# Patient Record
Sex: MALE | Race: BLACK OR AFRICAN AMERICAN | Employment: UNEMPLOYED | URBAN - METROPOLITAN AREA
[De-identification: names, ages, dates, MRNs, and addresses within clinical notes are randomized per-mention and may not be internally consistent; named-entity substitution may affect disease eponyms.]

---

## 2018-06-12 ENCOUNTER — OFFICE VISIT (OUTPATIENT)
Dept: SURGERY | Age: 43
End: 2018-06-12

## 2018-06-12 VITALS
WEIGHT: 234 LBS | HEART RATE: 92 BPM | SYSTOLIC BLOOD PRESSURE: 132 MMHG | OXYGEN SATURATION: 98 % | RESPIRATION RATE: 16 BRPM | HEIGHT: 66 IN | DIASTOLIC BLOOD PRESSURE: 80 MMHG | BODY MASS INDEX: 37.61 KG/M2 | TEMPERATURE: 98.3 F

## 2018-06-12 DIAGNOSIS — L91.0 KELOID: Primary | ICD-10-CM

## 2018-06-12 PROBLEM — E66.01 SEVERE OBESITY (BMI 35.0-39.9): Status: ACTIVE | Noted: 2018-06-12

## 2018-06-12 NOTE — PROGRESS NOTES
HISTORY OF PRESENT ILLNESS  Grant Clark is a 38 y.o. male who presents for evaluation of a keloid on the posterior aspect of his scalp. HPI Comments: Mr. Davia Felty tells me that he has had a keloid on the posterior aspect of his scalp for some time now. The keloid has become somewhat larger and more bothersome to him. No associated drainage or bleeding. He has otherwise been in his usual state of health. Past Medical History:  6/12/2018: Keloid    History reviewed. No pertinent surgical history. Review of patient's family history indicates:    Cancer                         Mother                    Social History: Employment - [employer]. Tobacco - Denies. EtOH - Denies. Review of systems negative except as noted. Review of Systems   Musculoskeletal: Positive for back pain. Discomfort at site of keloid. Physical Exam   Constitutional: He appears well-developed and well-nourished. No distress. HENT:   Head: Normocephalic and atraumatic. Eyes: No scleral icterus. Neck: Neck supple. Cardiovascular: Normal rate. Pulmonary/Chest: Effort normal and breath sounds normal.   Abdominal: Soft. He exhibits no distension. There is no tenderness. Musculoskeletal: Normal range of motion. Lymphadenopathy:     He has no cervical adenopathy. Neurological: He is alert. Skin:        Approx. 3cm x 2cm keloid. No ulceration or bleeding. Vitals reviewed. ASSESSMENT and PLAN  In view of the findings on H and P, Mr. Davia Felty should benefit from excision of the keloid and application of acellular xenograft. Discussed procedure with him including risks of bleeding, infection, keloid recurrence. Also discussed role of acellular xenograft. He understands and wishes to proceed. I have tentatively scheduled Mr. Davia Felty for surgery on June 22, 2018 at [address] [place] [address] and will see him back in the office postoperatively.  He is agreeable to this plan of action and is most certainly free to contact the office should any questions or concerns arise.

## 2018-06-12 NOTE — MR AVS SNAPSHOT
[address] [facility] [address] [address] 
[phone] Patient: Melody Kidd MRN: [id number] [facility]:[birth year] Visit Information Date & Time Provider Department Dept. Phone Encounter #  
 6/12/2018  2:20 PM MD Tayo Angeles [phone] [id number] Upcoming Health Maintenance Date Due DTaP/Tdap/Td series (1 - Tdap) 9/29/1994 Influenza Age 5 to Adult 8/1/2018 Allergies as of 6/12/2018  Review Complete On: 6/12/2018 By: Bryanna Hayes LPN No Known Allergies Current Immunizations  Never Reviewed No immunizations on file. Not reviewed this visit Vitals BP Pulse Temp Resp Height(growth percentile) Weight(growth percentile) 132/80 (BP 1 Location: Right arm, BP Patient Position: Sitting) 92 98.3 °F (36.8 °C) (Oral) 16 5' 6\" (1.676 m) 234 lb (106.1 kg) SpO2 BMI Smoking Status 98% 37.77 kg/m2 Never Smoker BMI and BSA Data Body Mass Index Body Surface Area  
 37.77 kg/m 2 2.22 m 2 Your Updated Medication List  
  
Notice  As of 6/12/2018  2:47 PM  
 You have not been prescribed any medications. Introducing [facility] & HEALTH SERVICES! Dear Erwin Capone: Thank you for requesting a Smalldeals account. Our records indicate that you already have an active Smalldeals account. You can access your account anytime at [email or web address]. [email or web address] Did you know that you can access your hospital and ER discharge instructions at any time in Smalldeals? You can also review all of your test results from your hospital stay or ER visit. Additional Information If you have questions, please visit the Frequently Asked Questions section of the Smalldeals website at [email or web address] Remember, Smalldeals is NOT to be used for urgent needs. For medical emergencies, dial 911. Now available from your iPhone and Android! Please provide this summary of care documentation to your next provider. Your primary care clinician is listed as Phys Other. If you have any questions after today's visit, please call [phone].

## 2018-06-12 NOTE — PROGRESS NOTES
1. Have you been to the ER, urgent care clinic since your last visit? Hospitalized since your last visit? No    2. Have you seen or consulted any other health care providers outside of the [address] [place] [address] since your last visit? Include any pap smears or colon screening.  No

## 2018-06-15 RX ORDER — BUPIVACAINE HYDROCHLORIDE 2.5 MG/ML
30 INJECTION, SOLUTION EPIDURAL; INFILTRATION; INTRACAUDAL ONCE
Status: CANCELLED | OUTPATIENT
Start: 2018-06-15 | End: 2018-06-15

## 2018-07-23 ENCOUNTER — OFFICE VISIT (OUTPATIENT)
Dept: SURGERY | Age: 43
End: 2018-07-23

## 2018-07-23 VITALS
WEIGHT: 234 LBS | SYSTOLIC BLOOD PRESSURE: 120 MMHG | OXYGEN SATURATION: 98 % | BODY MASS INDEX: 37.61 KG/M2 | HEART RATE: 84 BPM | DIASTOLIC BLOOD PRESSURE: 82 MMHG | RESPIRATION RATE: 16 BRPM | TEMPERATURE: 98.1 F | HEIGHT: 66 IN

## 2018-07-23 DIAGNOSIS — L91.0 KELOID: Primary | ICD-10-CM

## 2018-07-23 NOTE — PROGRESS NOTES
1. Have you been to the ER, urgent care clinic since your last visit? Hospitalized since your last visit? No    2. Have you seen or consulted any other health care providers outside of the [address] [place] [address] since your last visit? Include any pap smears or colon screening.  No

## 2018-07-23 NOTE — PROGRESS NOTES
HISTORY OF PRESENT ILLNESS  Jeison Jorge is a 38 y.o. male who returns for repeat evaluation of a keloid on his posterior scalp. HPI Comments: Mr. Margarita Alfaro was initially seen on 6/12/2018 for evaluation of a keloid on the posterior aspect of his scalp. Doing fairly well since then. He tells me that the keloid is becoming larger and more bothersome to him. He has also noted bleeding from the keloid. He has otherwise been in his usual state of health. Past Medical History:  6/12/2018: Keloid    History reviewed. No pertinent surgical history. Review of patient's family history indicates:    Cancer                         Mother                    Social History    Marital status:              Spouse name:                       Years of education:                 Number of children:               Social History Main Topics    Smoking status: Never Smoker                                                                Smokeless status: Never Used                        Alcohol use: No              Drug use: No              Sexual activity: Yes               Partners with: Female    Review of systems negative except as noted. Keloid   The history is provided by the patient. Review of Systems   Musculoskeletal:        Pain at site of keloid. Bleeding from keloid. Physical Exam   Constitutional: He appears well-developed and well-nourished. No distress. HENT:   Head: Normocephalic and atraumatic. Eyes: No scleral icterus. Neck: Neck supple. Cardiovascular: Normal rate and regular rhythm. Pulmonary/Chest: Effort normal and breath sounds normal.   Abdominal: Soft. He exhibits no distension. There is no tenderness. Musculoskeletal: Normal range of motion. Lymphadenopathy:     He has no cervical adenopathy. Neurological: He is alert. Skin:        Approx. 3cm x 2.5cm keloid. No active infection. No bleeding. Vitals reviewed.       ASSESSMENT and PLAN  In view of the findings on H and P, Mr. Jojo Hall should benefit from excision of the keloid as it is becoming larger and is now bleeding. Discussed excision of the keloid with him including risks of bleeding, infection, recurrence. He understands and wishes to proceed. I have tentatively scheduled Mr. Jojo Hall for surgery on August 3, 2018 at [facility] and will see him back in the office postoperatively. He is agreeable to this plan of action and is most certainly free to contact the office should any questions or concerns arise.

## 2018-07-23 NOTE — MR AVS SNAPSHOT
[address] [facility] [address] [address] 
[phone] Patient: Jaz Vu MRN: [id number] RUK:2[birth year] Visit Information Date & Time Provider Department Dept. Phone Encounter #  
 7/23/2018  9:20 AM MD Prateek [address] 137 [phone] [id number] Upcoming Health Maintenance Date Due DTaP/Tdap/Td series (1 - Tdap) 9/29/1994 Influenza Age 5 to Adult 8/1/2018 Allergies as of 7/23/2018  Review Complete On: 7/23/2018 By: Chelle Valencia LPN No Known Allergies Current Immunizations  Never Reviewed No immunizations on file. Not reviewed this visit Vitals BP Pulse Temp Resp Height(growth percentile) Weight(growth percentile) 120/82 (BP 1 Location: Right arm, BP Patient Position: Sitting) 84 98.1 °F (36.7 °C) (Oral) 16 5' 6\" (1.676 m) 234 lb (106.1 kg) SpO2 BMI Smoking Status 98% 37.77 kg/m2 Never Smoker BMI and BSA Data Body Mass Index Body Surface Area  
 37.77 kg/m 2 2.22 m 2 Your Updated Medication List  
  
Notice  As of 7/23/2018  9:39 AM  
 You have not been prescribed any medications. Introducing [facility] & HEALTH SERVICES! Dear Karo Officer: Thank you for requesting a Fi.tt account. Our records indicate that you already have an active Fi.tt account. You can access your account anytime at [email or web address]. [email or web address] Did you know that you can access your hospital and ER discharge instructions at any time in Fi.tt? You can also review all of your test results from your hospital stay or ER visit. Additional Information If you have questions, please visit the Frequently Asked Questions section of the Fi.tt website at [email or web address]. [email or web address]/. Remember, Fi.tt is NOT to be used for urgent needs. For medical emergencies, dial 911. Now available from your iPhone and Android! Please provide this summary of care documentation to your next provider. Your primary care clinician is listed as Phys Other. If you have any questions after today's visit, please call [phone].

## 2018-08-20 ENCOUNTER — OFFICE VISIT (OUTPATIENT)
Dept: SURGERY | Age: 43
End: 2018-08-20

## 2018-08-20 VITALS — DIASTOLIC BLOOD PRESSURE: 86 MMHG | HEART RATE: 84 BPM | TEMPERATURE: 98.1 F | SYSTOLIC BLOOD PRESSURE: 132 MMHG

## 2018-08-20 DIAGNOSIS — L91.0 KELOID: Primary | ICD-10-CM

## 2018-08-20 NOTE — MR AVS SNAPSHOT
[address] [facility] [address] [address] 
[phone] Patient: Shayla Layne MRN: [id number] CSS:8/18/6350 Visit Information Date & Time Provider Department Dept. Phone Encounter #  
 8/20/2018  2:20 PM MD Ezequiel L[address] 137 [phone] [id number] Upcoming Health Maintenance Date Due DTaP/Tdap/Td series (1 - Tdap) 9/29/1994 Influenza Age 5 to Adult 8/1/2018 Allergies as of 8/20/2018  Review Complete On: 8/20/2018 By: Brenda Borrego MD  
 No Known Allergies Current Immunizations  Never Reviewed No immunizations on file. Not reviewed this visit You Were Diagnosed With   
  
 Codes Comments Keloid    -  Primary ICD-10-CM: L91.0 ICD-9-CM: 701.4 Vitals BP Pulse Temp Smoking Status 132/86 (BP 1 Location: Left arm, BP Patient Position: Sitting) 84 98.1 °F (36.7 °C) (Oral) Never Smoker Your Updated Medication List  
  
Notice  As of 8/20/2018  4:01 PM  
 You have not been prescribed any medications. Introducing [facility] & HEALTH SERVICES! Dear Mary Carmen Calderón: Thank you for requesting a FleetMatics account. Our records indicate that you already have an active FleetMatics account. You can access your account anytime at [email or web address]. [email or web address] Did you know that you can access your hospital and ER discharge instructions at any time in FleetMatics? You can also review all of your test results from your hospital stay or ER visit. Additional Information If you have questions, please visit the Frequently Asked Questions section of the [employer] website at [email or web address]/. Remember, FleetMatics is NOT to be used for urgent needs. For medical emergencies, dial 911. Now available from your iPhone and Android! Please provide this summary of care documentation to your next provider. Your primary care clinician is listed as Phys Other. If you have any questions after today's visit, please call [phone].

## 2018-08-20 NOTE — PROGRESS NOTES
HISTORY OF PRESENT ILLNESS  Nito Carvajal is a 38 y.o. male who returns for evaluation of a keloid on the posterior scalp. HPI Comments: Mr. Nancy Bentley has been doing fairly well since his last visit. He reports no significant change in the size of the keloid. However, the keloid has recently started to bleed. Insurance has declined coverage for keloid excision. He has otherwise been in his usual state of health. Past Medical History:  6/12/2018: Keloid    History reviewed. No pertinent surgical history. Review of patient's family history indicates:    Cancer                         Mother                    Social History    Marital status:              Spouse name:                       Years of education:                 Number of children:               Social History Main Topics    Smoking status: Never Smoker                                                                Smokeless status: Never Used                        Alcohol use: No              Drug use: No              Sexual activity: Yes               Partners with: Female    Review of systems negative except as noted. Review of Systems   Musculoskeletal:        Discomfort at site of keloid. Physical Exam   Constitutional: He appears well-developed and well-nourished. No distress. HENT:   Head: Normocephalic and atraumatic. Eyes: No scleral icterus. Neck: Neck supple. Cardiovascular: Normal rate and regular rhythm. Pulmonary/Chest: Effort normal and breath sounds normal.   Abdominal: He exhibits no distension. There is no tenderness. Musculoskeletal: Normal range of motion. Lymphadenopathy:     He has no cervical adenopathy. Neurological: He is alert. Skin:        Approx. 3cm x 2.5cm keloid. There is evidence of recent bleeding. Vitals reviewed. ASSESSMENT and PLAN  In view of the findings on H and P, Mr. Nancy Bentley should benefit from excision of the keloid as it is bleeding intermittently.  Discussed procedure with him including risks of bleeding, infection, recurrence. He understands and wishes to proceed. Will schedule surgery if approved. In the interim, will try Kenalog injection. Discussed procedure with him including risks of bleeding, infection, possible need for further injections. He understands and wishes to proceed. Consent on chart. [facility] GENERAL SURGERY [address]  OFFICE PROCEDURE PROGRESS NOTE        Chart reviewed for the following:   Rodrigo Sullivan MD, have reviewed the History, Physical and updated the Allergic reactions for [address] performed immediately prior to start of procedure:   Rodrigo Sullivan MD, have performed the following reviews on Lear Carvajal prior to the start of the procedure:            * Patient was identified by name and date of birth   * Agreement on procedure being performed was verified  * Risks and Benefits explained to the patient  * Procedure site verified and marked as necessary  * Patient was positioned for comfort  * Consent was signed and verified     Time: 3:35 PM      Date of procedure: 8/20/2018    Procedure performed by:  Deena Self MD    Provider assisted by: Hernando Thomas LPN    How tolerated by patient: tolerated the procedure well with no complications    Post Procedural Pain Scale: 0 - No Hurt    Comments: None. Procedure:    Keloid prepped with betadine. The keloid was injected with 10cc of a 1:1 mixture of Kenalog 40 and 2% Lidocaine with epinephrine. Hemostasis with pressure. At this point in time, will see Mr. Nancy Bentley in another 4 weeks or earlier if need be. He may benefit from repeat Kenalog injection at some point. He is agreeable to this plan and is most certainly free to contact the office should any questions or concerns arise.

## 2018-09-17 ENCOUNTER — OFFICE VISIT (OUTPATIENT)
Dept: SURGERY | Age: 43
End: 2018-09-17

## 2018-09-17 VITALS
OXYGEN SATURATION: 96 % | RESPIRATION RATE: 16 BRPM | HEIGHT: 66 IN | TEMPERATURE: 98.2 F | DIASTOLIC BLOOD PRESSURE: 88 MMHG | SYSTOLIC BLOOD PRESSURE: 133 MMHG | BODY MASS INDEX: 44.11 KG/M2 | WEIGHT: 274.5 LBS | HEART RATE: 76 BPM

## 2018-09-17 DIAGNOSIS — L91.0 KELOID: Primary | ICD-10-CM

## 2018-09-17 NOTE — PROGRESS NOTES
HISTORY OF PRESENT ILLNESS  Elly Romero is a 38 y.o. male who returns for follow up of a keloid on the posterior aspect of his scalp. HPI Comments: Mr. Nandini Cochran was last seen on 8/20/2018 at which time he underwent Kenalog injection of the keloid on the posterior aspect of his scalp. Doing well since then. He reports no significant change in the size of the keloid. He would like to try another Kenalog injection. He has otherwise been in his usual state of health. Past Medical History:  6/12/2018: Keloid    History reviewed. No pertinent surgical history. Review of patient's family history indicates:    Cancer                         Mother                    Social History    Marital status:              Spouse name:                       Years of education:                 Number of children:               Social History Main Topics    Smoking status: Never Smoker                                                                Smokeless status: Never Used                        Alcohol use: No              Drug use: No              Sexual activity: Yes               Partners with: Female    Review of systems negative except as noted. Review of Systems   Musculoskeletal:        Discomfort at site of keloid. Physical Exam   Constitutional: He appears well-developed and well-nourished. No distress. HENT:   Head: Normocephalic and atraumatic. Eyes: No scleral icterus. Cardiovascular: Normal rate and regular rhythm. Pulmonary/Chest: Effort normal and breath sounds normal.   Abdominal: Soft. He exhibits no distension. There is no tenderness. Musculoskeletal: Normal range of motion. Neurological: He is alert. Skin:        No significant change in size of the 3cm x 2cm keloid. No associated ulceration or bleeding. Vitals reviewed. ASSESSMENT and PLAN  In view of the findings on H and P, Mr. Nandini Cochran may benefit from repeat Kenalog injection of the keloid.  Discussed procedure with him including risks of bleeding, infection, possibility that there will be no improvement in the keloid. He understands and wishes to proceed. Consent on chart. Mr. Linda Buckner had to leave before the procedure could be performed. He will return on 9/18/2018 for Kenalog injection.

## 2018-09-17 NOTE — MR AVS SNAPSHOT
[address] [facility] [address] [facility] [address] [address] 
[phone] Patient: Vincent Summers MRN: [id number] BSB:[birth year] Visit Information Date & Time Provider Department Dept. Phone Encounter #  
 9/17/2018  2:00 PM Stormy Pimentel MD [address] [phone] [id number] Your Appointments 9/18/2018  4:00 PM  
ESTABLISHED PATIENT with Stormy Pimentel MD  
[address] ([address]) Appt Note: EP/ Patient is coming in for 2nd steroid injections for scalp keloid [address] [facility] [address] [facility] [address]  
[address] [place] [address] Upcoming Health Maintenance Date Due DTaP/Tdap/Td series (1 - Tdap) 9/29/1994 Influenza Age 5 to Adult 8/1/2018 Allergies as of 9/17/2018  Review Complete On: 9/17/2018 By: Stormy Pimentel MD  
 No Known Allergies Current Immunizations  Never Reviewed No immunizations on file. Not reviewed this visit You Were Diagnosed With   
  
 Codes Comments Keloid    -  Primary ICD-10-CM: L91.0 ICD-9-CM: 701.4 Vitals BP Pulse Temp Resp Height(growth percentile) Weight(growth percentile) 133/88 (BP 1 Location: Left arm, BP Patient Position: Sitting) 76 98.2 °F (36.8 °C) (Oral) 16 5' 6\" (1.676 m) 274 lb 8 oz (124.5 kg) SpO2 BMI Smoking Status 96% 44.31 kg/m2 Never Smoker BMI and BSA Data Body Mass Index Body Surface Area 44.31 kg/m 2 2.41 m 2 Your Updated Medication List  
  
Notice  As of 9/17/2018  4:49 PM  
 You have not been prescribed any medications. Introducing [facility] & HEALTH SERVICES! Dear Miriam Han: Thank you for requesting a GamePix account. Our records indicate that you already have an active GamePix account. You can access your account anytime at [email or web address]. [email or web address] Media Did you know that you can access your hospital and ER discharge instructions at any time in [employer]? You can also review all of your test results from your hospital stay or ER visit. Additional Information If you have questions, please visit the Frequently Asked Questions section of the [employer] website at [email or web address]. [email or web address]/Peku Publicationst/. Remember, [employer] is NOT to be used for urgent needs. For medical emergencies, dial 911. Now available from your iPhone and Android! Please provide this summary of care documentation to your next provider. Your primary care clinician is listed as Phys Other. If you have any questions after today's visit, please call [phone].

## 2018-09-17 NOTE — PROGRESS NOTES
1. Have you been to the ER, urgent care clinic since your last visit? Hospitalized since your last visit? No    2. Have you seen or consulted any other health care providers outside of the [address] [place] [address] since your last visit? Include any pap smears or colon screening.  No

## 2020-06-22 ENCOUNTER — HOSPITAL ENCOUNTER (OUTPATIENT)
Dept: LAB | Age: 45
Discharge: HOME | End: 2020-06-22

## 2020-06-22 ENCOUNTER — OFFICE VISIT (OUTPATIENT)
Dept: FAMILY MEDICINE CLINIC | Age: 45
End: 2020-06-22

## 2020-06-22 VITALS
SYSTOLIC BLOOD PRESSURE: 121 MMHG | HEIGHT: 66 IN | OXYGEN SATURATION: 96 % | HEART RATE: 88 BPM | WEIGHT: 286 LBS | TEMPERATURE: 98.3 F | RESPIRATION RATE: 16 BRPM | BODY MASS INDEX: 45.96 KG/M2 | DIASTOLIC BLOOD PRESSURE: 87 MMHG

## 2020-06-22 DIAGNOSIS — R51.9 HEADACHE ABOVE THE EYE REGION: ICD-10-CM

## 2020-06-22 DIAGNOSIS — Z76.89 ENCOUNTER TO ESTABLISH CARE: ICD-10-CM

## 2020-06-22 DIAGNOSIS — R20.0 NUMBNESS AND TINGLING IN BOTH HANDS: Primary | ICD-10-CM

## 2020-06-22 DIAGNOSIS — R20.2 NUMBNESS AND TINGLING IN BOTH HANDS: Primary | ICD-10-CM

## 2020-06-22 LAB
ALBUMIN SERPL-MCNC: 3.9 G/DL (ref 3.5–5)
ALBUMIN/GLOB SERPL: 1 {RATIO} (ref 1.1–2.2)
ALP SERPL-CCNC: 66 U/L (ref 45–117)
ALT SERPL-CCNC: 79 U/L (ref 12–78)
ANION GAP SERPL CALC-SCNC: 9 MMOL/L (ref 5–15)
AST SERPL-CCNC: 33 U/L (ref 15–37)
BASOPHILS # BLD: 0 K/UL (ref 0–0.1)
BASOPHILS NFR BLD: 0 % (ref 0–1)
BILIRUB SERPL-MCNC: 0.4 MG/DL (ref 0.2–1)
BUN SERPL-MCNC: 21 MG/DL (ref 6–20)
BUN/CREAT SERPL: 22 (ref 12–20)
CALCIUM SERPL-MCNC: 9.2 MG/DL (ref 8.5–10.1)
CHLORIDE SERPL-SCNC: 105 MMOL/L (ref 97–108)
CHOLEST SERPL-MCNC: 201 MG/DL
CO2 SERPL-SCNC: 23 MMOL/L (ref 21–32)
CREAT SERPL-MCNC: 0.95 MG/DL (ref 0.7–1.3)
DIFFERENTIAL METHOD BLD: NORMAL
EOSINOPHIL # BLD: 0.1 K/UL (ref 0–0.4)
EOSINOPHIL NFR BLD: 1 % (ref 0–7)
ERYTHROCYTE [DISTWIDTH] IN BLOOD BY AUTOMATED COUNT: 14.5 % (ref 11.5–14.5)
EST. AVERAGE GLUCOSE BLD GHB EST-MCNC: 120 MG/DL
GLOBULIN SER CALC-MCNC: 3.8 G/DL (ref 2–4)
GLUCOSE SERPL-MCNC: 101 MG/DL (ref 65–100)
HBA1C MFR BLD: 5.8 % (ref 4–5.6)
HCT VFR BLD AUTO: 48.6 % (ref 36.6–50.3)
HDLC SERPL-MCNC: 38 MG/DL
HDLC SERPL: 5.3 {RATIO} (ref 0–5)
HGB BLD-MCNC: 15.8 G/DL (ref 12.1–17)
IMM GRANULOCYTES # BLD AUTO: 0 K/UL (ref 0–0.04)
IMM GRANULOCYTES NFR BLD AUTO: 0 % (ref 0–0.5)
LDLC SERPL CALC-MCNC: 121.2 MG/DL (ref 0–100)
LIPID PROFILE,FLP: ABNORMAL
LYMPHOCYTES # BLD: 2.1 K/UL (ref 0.8–3.5)
LYMPHOCYTES NFR BLD: 29 % (ref 12–49)
MCH RBC QN AUTO: 29 PG (ref 26–34)
MCHC RBC AUTO-ENTMCNC: 32.5 G/DL (ref 30–36.5)
MCV RBC AUTO: 89.3 FL (ref 80–99)
MONOCYTES # BLD: 0.6 K/UL (ref 0–1)
MONOCYTES NFR BLD: 8 % (ref 5–13)
NEUTS SEG # BLD: 4.4 K/UL (ref 1.8–8)
NEUTS SEG NFR BLD: 62 % (ref 32–75)
NRBC # BLD: 0 K/UL (ref 0–0.01)
NRBC BLD-RTO: 0 PER 100 WBC
PLATELET # BLD AUTO: 221 K/UL (ref 150–400)
PMV BLD AUTO: 12.1 FL (ref 8.9–12.9)
POTASSIUM SERPL-SCNC: 4.5 MMOL/L (ref 3.5–5.1)
PROT SERPL-MCNC: 7.7 G/DL (ref 6.4–8.2)
RBC # BLD AUTO: 5.44 M/UL (ref 4.1–5.7)
SODIUM SERPL-SCNC: 137 MMOL/L (ref 136–145)
TRIGL SERPL-MCNC: 209 MG/DL (ref ?–150)
TSH SERPL DL<=0.05 MIU/L-ACNC: 1.84 UIU/ML (ref 0.36–3.74)
VLDLC SERPL CALC-MCNC: 41.8 MG/DL
WBC # BLD AUTO: 7.2 K/UL (ref 4.1–11.1)

## 2020-06-22 RX ORDER — NAPROXEN SODIUM 220 MG
220 TABLET ORAL 2 TIMES DAILY WITH MEALS
COMMUNITY

## 2020-06-22 RX ORDER — ACETAMINOPHEN 500 MG
TABLET ORAL
COMMUNITY

## 2020-06-22 NOTE — PROGRESS NOTES
Leola Marin is a 53 y.o. male who presents to clinic today for the following:    Chief Complaint   Patient presents with    Numbness     Patient reports numbness and tingling bilateral hands past few year. Patient reports starting more frequent.  Headache    Referral / Consult     Back pain. Patient denies any injury to back. Vitals:    06/22/20 1127   BP: 121/87   Pulse: 88   Resp: 16   Temp: 98.3 °F (36.8 °C)   TempSrc: Oral   SpO2: 96%   Weight: 286 lb (129.7 kg)   Height: 5' 6\" (1.676 m)       Body mass index is 46.16 kg/m². Patients past medical, surgical and family histories were reviewed. Allergies and Medications reviewed and updated. Current Outpatient Medications:     naproxen sodium (Aleve) 220 mg tablet, Take 220 mg by mouth two (2) times daily (with meals). , Disp: , Rfl:     acetaminophen (Tylenol Extra Strength) 500 mg tablet, Take  by mouth every six (6) hours as needed for Pain., Disp: , Rfl:     No Known Allergies    Past Medical History:   Diagnosis Date    Keloid 6/12/2018       Past Surgical History:   Procedure Laterality Date    HX SKIN BIOPSY         Family History   Problem Relation Age of Onset    Cancer Mother        Social History     Socioeconomic History    Marital status:      Spouse name: Not on file    Number of children: Not on file    Years of education: Not on file    Highest education level: Not on file   Occupational History    Not on file   Social Needs    Financial resource strain: Not on file    Food insecurity     Worry: Not on file     Inability: Not on file   [employer] needs     Medical: Not on file     Non-medical: Not on file   Tobacco Use    Smoking status: Never Smoker    Smokeless tobacco: Never Used   Substance and Sexual Activity    Alcohol use: No    Drug use: No    Sexual activity: Yes     Partners: Female   Lifestyle    Physical activity     Days per week: Not on file     Minutes per session: Not on file    Stress: Not on file   Relationships    Social connections     Talks on phone: Not on file     Gets together: Not on file     Attends Yazidi service: Not on file     Active member of club or organization: Not on file     Attends meetings of clubs or organizations: Not on file     Relationship status: Not on file    Intimate partner violence     Fear of current or ex partner: Not on file     Emotionally abused: Not on file     Physically abused: Not on file     Forced sexual activity: Not on file   Other Topics Concern    Not on file   Social History Narrative    Not on file           Physical Exam  Vitals signs reviewed. Constitutional:       Appearance: He is obese. HENT:      Head: Normocephalic and atraumatic. Nose: Nose normal.      Mouth/Throat:      Mouth: Mucous membranes are moist.   Eyes:      Pupils: Pupils are equal, round, and reactive to light. Neck:      Musculoskeletal: Normal range of motion. Cardiovascular:      Rate and Rhythm: Normal rate and regular rhythm. Pulses: Normal pulses. Heart sounds: Normal heart sounds. Pulmonary:      Effort: Pulmonary effort is normal.      Breath sounds: Normal breath sounds. Abdominal:      Palpations: Abdomen is soft. Musculoskeletal:      Comments: Low back pain, bilateral arm numbness at times   Skin:     General: Skin is warm and dry. Capillary Refill: Capillary refill takes less than 2 seconds. Neurological:      General: No focal deficit present. Mental Status: He is alert and oriented to person, place, and time. Cranial Nerves: Cranial nerves are intact. Sensory: No sensory deficit. Coordination: Coordination is intact. Gait: Gait is intact. Comments: Bilateral arm numbness times          Pt is new today, establish care. PT has occasion HA above eys at times, none current, occasional bilateral arm numbness and tingling when hold phone and low back pain.   Pt today has a simone Neuro exam, clear BBS, no cp. PT has not had labs done, ordered today, refer to Neuro for nerve pain and numbess as well as HA, PT was wanted for low back pain. Will alisha sal with pt when labs return for further POC. Review of Systems   Constitutional: Negative. HENT: Negative. Eyes: Negative. Respiratory: Negative. Cardiovascular: Negative. Gastrointestinal: Negative. Musculoskeletal: Positive for back pain. Skin: Negative. Neurological: Positive for tingling. Psychiatric/Behavioral: Negative. No results found. No results found for this or any previous visit (from the past 24 hour(s)). Assessment and Plan:    Encounter Diagnoses   Name Primary?  Numbness and tingling in both hands Yes    Headache above the eye region     Encounter to establish care         Pt is new today, establish care. PT has occasion HA above eys at times, none current, occasional bilateral arm numbness and tingling when hold phone and low back pain. Pt today has a simone Neuro exam, clear BBS, no cp. PT has not had labs done, ordered today, refer to Neuro for nerve pain and numbess as well as HA, PT was wanted for low back pain. Will alisha oronap with pt when labs return for further POC. I have discussed the diagnosis with the patient and the intended plan as seen in the above orders. he has expressed understanding. The patient has received an after-visit summary and questions were answered concerning future plans. I have discussed medication side effects and warnings with the patient as well. Follow-up and Dispositions    · Return in about 2 weeks (around 7/6/2020), or if symptoms worsen or fail to improve, for Follow up.          Electronically Signed: Lata Parmar NP

## 2020-06-22 NOTE — PATIENT INSTRUCTIONS
Today you were evalauted for Headache, numbness in your arms and hands and to establish care. We have drawn labs and will follow up with you the results in a couple of days. I have sent a referral to Neurology for your numbness. Please call them by tomorrow if you have not heard from them. I am also sending a refereal for Physical Therapy for your back pain. Again, we will follow up with labs, let us know of any other concerns as the arise.

## 2020-06-22 NOTE — PROGRESS NOTES
1. Have you been to the ER, urgent care clinic since your last visit? Hospitalized since your last visit? Yes When: 6/2020 Where: [facility] Reason for visit: Blood Sugar elevated    2. Have you seen or consulted any other health care providers outside of the [address] [place] [address] since your last visit? Include any pap smears or colon screening. No     Chief Complaint   Patient presents with    Numbness     Patient reports numbness and tingling bilateral hands past few year. Patient reports starting more frequent.     Headache       Visit Vitals  /87 (BP 1 Location: Left arm, BP Patient Position: Sitting)   Pulse 88   Temp 98.3 °F (36.8 °C) (Oral)   Resp 16   Ht 5' 6\" (1.676 m)   Wt 286 lb (129.7 kg)   SpO2 96%   BMI 46.16 kg/m²

## 2020-06-23 ENCOUNTER — OFFICE VISIT (OUTPATIENT)
Dept: NEUROLOGY | Age: 45
End: 2020-06-23

## 2020-06-23 VITALS
DIASTOLIC BLOOD PRESSURE: 80 MMHG | BODY MASS INDEX: 45.96 KG/M2 | HEART RATE: 88 BPM | WEIGHT: 286 LBS | OXYGEN SATURATION: 98 % | SYSTOLIC BLOOD PRESSURE: 130 MMHG | HEIGHT: 66 IN | TEMPERATURE: 98.2 F

## 2020-06-23 DIAGNOSIS — G44.219 EPISODIC TENSION-TYPE HEADACHE, NOT INTRACTABLE: ICD-10-CM

## 2020-06-23 DIAGNOSIS — M54.12 CERVICAL RADICULOPATHY: ICD-10-CM

## 2020-06-23 DIAGNOSIS — R29.898 LEFT ARM WEAKNESS: Primary | ICD-10-CM

## 2020-06-23 NOTE — PATIENT INSTRUCTIONS
Electromyogram (EMG) and Nerve Conduction Studies: About These Tests What are they? An electromyogram (EMG) measures the electrical activity of your muscles when you are not using them (at rest) and when you tighten them (muscle contraction). Nerve conduction studies (NCS) measure how well and how fast the nerves can send electrical signals. EMG and nerve conduction studies are often done together. If they are done together, the nerve conduction studies are done before the EMG. Why are they done? You may need an EMG to find diseases that damage your muscles or nerves or to find why you can't move your muscles (paralysis), why they feel weak, or why they twitch. These problems may include a herniated disc, amyotrophic lateral sclerosis (ALS), or myasthenia gravis (MG). You may need nerve conduction studies to find damage to the nerves that lead from the brain and spinal cord to the rest of the body. (This is called the peripheral nervous system.) These studies are often used to help find nerve disorders, such as carpal tunnel syndrome. How do you prepare for these tests? · Wear loose-fitting clothing. You may be given a hospital gown to wear. · The electrodes for the test are attached to your skin. Your skin needs to be clean and free of sprays, oils, creams, and lotions. · You may be asked to sign a consent form that says you understand the risks of the test and agree to have it done. · Tell your doctor ALL the medicines, vitamins, supplements, and herbal remedies you take. Some may increase the risk of problems during your test. Your doctor will tell you if you should stop taking any of them before the test and how soon to do it. · If you take aspirin or some other blood thinner, ask your doctor if you should stop taking it before your test. Make sure that you understand exactly what your doctor wants you to do. These medicines increase the risk of bleeding. How are the tests done? You lie on a table or bed or sit in a reclining chair so your muscles are relaxed. For an EMG:  
· Your doctor will insert a needle electrode into a muscle. This will record the electrical activity while the muscle is at rest. 
· Your doctor will ask you to tighten the same muscle slowly and steadily while the electrical activity is recorded. · Your doctor may move the electrode to a different area of the muscle or a different muscle. For nerve conduction studies:  
· Your doctor will attach two types of electrodes to your skin. ? One type of electrode is placed over a nerve and will give the nerve an electrical pulse. ? The other type of electrode is placed over the muscle that the nerve controls. It will record how long it takes the muscle to react to the electrical pulse. How does having electromyogram (EMG) and nerve conduction studies feel? During an EMG test, you may feel a quick, sharp pain when the needle electrode is put into a muscle. With nerve conduction studies, you will be able to feel the electrical pulses. The tests make some people anxious. Keep in mind that only a very low-voltage electrical current is used. And each electrical pulse is very quick. It lasts less than a second. How long do they take? · An EMG may take 30 to 60 minutes. · Nerve conduction tests may take from 15 minutes to 1 hour or more. It depends on how many nerves and muscles your doctor tests. What happens after these tests? · After the test, you may be sore and feel a tingling in your muscles. This may last for up to 2 days. · If any of the test areas are sore: 
? Put ice or a cold pack on the area for 10 to 20 minutes at a time. Put a thin cloth between the ice and your skin. ? Take an over-the-counter pain medicine, such as acetaminophen (Tylenol), ibuprofen (Advil, Motrin), or naproxen (Aleve). Be safe with medicines. Read and follow all instructions on the label. · You will probably be able to go home right away. It depends on the reason for the test. 
· You can go back to your usual activities right away. When should you call for help? Watch closely for changes in your health, and be sure to contact your doctor if: · Muscle pain from an EMG test gets worse or you have swelling, tenderness, or pus at any of the needle sites. · You have any problems that you think may be from the test. 
· You have any questions about the test or have not received your results. Follow-up care is a key part of your treatment and safety. Be sure to make and go to all appointments, and call your doctor if you are having problems. It's also a good idea to keep a list of the medicines you take. Ask your doctor when you can expect to have your test results. Where can you learn more? Go to http://bee-tanner.info/ Enter S863 in the search box to learn more about \"Electromyogram (EMG) and Nerve Conduction Studies: About These Tests. \" Current as of: November 20, 2019               Content Version: 12.5 © 2956-3719 [employer]. Care instructions adapted under license by [employer] (which disclaims liability or warranty for this information). If you have questions about a medical condition or this instruction, always ask your healthcare professional. [address] any warranty or liability for your use of this information. MRI of the Cervical Spine: About This Test 
What is it? MRI (magnetic resonance imaging) is a test that uses a magnetic field and pulses of radio wave energy to make pictures of the organs and structures inside the body. An MRI can give your doctor information about the spine in your neck (the cervical spine). This can include the spine, the space around the spinal cord, and vertebrae in your neck. When you have an MRI, you lie on a table and the table moves into the MRI machine. Why is this test done? An MRI of the cervical spine can help find problems such as infection and tumors. It also can help diagnose narrowing of the spinal canal (spinal stenosis) and a herniated disc in the cervical spine. How do you prepare for the test? 
In general, there's nothing you have to do before this test, unless your doctor tells you to. Tell your doctor if you get nervous in tight spaces. You may get a medicine to help you relax. If you think you'll get this medicine, be sure you have someone to take you home. How is the test done? · You may have contrast material (dye) put into your arm through a tube called an IV. · You will lie on a table that's part of the MRI scanner. · The table will slide into the space that contains the magnet. · Inside the scanner, you will hear a fan and feel air moving. You may hear tapping, thumping, or snapping noises. You may be given earplugs or headphones to reduce the noise. · You will be asked to hold still during the scan. You may be asked to hold your breath for short periods. · You may be alone in the scanning room. But a technologist will watch through a window and talk with you during the test. 
How does having an MRI of the spine feel? You won't have pain from the magnetic field or radio waves used for the MRI test. You may be tired or sore from lying in one position for a long time. If a contrast material is used, you may feel some coolness when it is put into your IV. In rare cases, you may feel: · Tingling in the mouth if you have metal dental fillings. · Warmth in the area being checked. This is normal. Tell the technologist if you have nausea, vomiting, a headache, dizziness, pain, burning, or breathing problems. How long does the test take? The test usually takes 30 to 60 minutes but can take as long as 2 hours. What are the risks of an MRI of the spine?  
There are no known harmful effects from the strong magnetic field used for an MRI. But the magnet is very powerful. It may affect any metal implants or other medical devices you have. Risks from contrast material 
Contrast material that contains gadolinium may be used in this test. But for most people, the benefit of its use in this test outweighs the risk. Be sure to tell your doctor if you have kidney problems or are pregnant. There is a slight chance of an allergic reaction if contrast material is used during the test. But most reactions are mild and can be treated using medicine. If you breastfeed and are concerned about whether the contrast material used in this test is safe, talk to your doctor. Most experts believe that very little dye passes into breast milk and even less is passed on to the baby. But if you are concerned, you can stop breastfeeding for up to 24 hours after the test. During this time, you can give your baby breast milk that you stored before the test. Don't use the breast milk you pump in the 24 hours after the test. Throw it out. What happens after the test? 
· You will probably be able to go home right away. It depends on the reason for the test. 
· You can go back to your usual activities right away. Follow-up care is a key part of your treatment and safety. Be sure to make and go to all appointments, and call your doctor if you are having problems. It's also a good idea to keep a list of the medicines you take. Ask your doctor when you can expect to have your test results. Where can you learn more? Go to http://bee-tanner.info/ Enter F223 in the search box to learn more about \"MRI of the Cervical Spine: About This Test.\" Current as of: December 9, 2019               Content Version: 12.5 © 9855-7077 [employer]. Care instructions adapted under license by [employer] (which disclaims liability or warranty for this information).  If you have questions about a medical condition or this instruction, always ask your healthcare professional. [place] [address] any warranty or liability for your use of this information.

## 2020-06-23 NOTE — PROGRESS NOTES
Your Cholesterol and Hemoglobin A1C is slightly elevated. Diet and exercise can help reduce this now. Please try these changes over the next 3 month and return for repeat labs. If they remain elevated, we may need to put you on medication to reduce the risk of stroke, heart attack and diabetes.

## 2020-06-23 NOTE — PROGRESS NOTES
Chief Complaint   Patient presents with    Headache     with numbness and tingling in finger, \"the headaches are not severe but they are progressively getting worse, the numbness in my hands is also getting worse\"       Referred by: Jean LUGO    Mr. Carrie Morton is a 40-year-old gentleman here because of numbness and tingling in both distal hands that have been going on for a few years worse in the past couple months symmetric beginning in the fingers radiating proximally to just below the elbows. No feet symptoms. He can sometimes worsen the paresthesias depending on the motion of his head or neck. Sometimes the symptoms wake him up at night. 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 He does have a history of degenerative disc disease he tells me diagnosed about 10 years ago both in the upper neck and low back. Additionally he is noticing some left supra orbital pain/headache non-debilitating no nausea or light sensitivity lasting about 10 or 15 minutes. No vision changes. Review of Systems   Eyes: Negative for blurred vision, double vision and photophobia. Gastrointestinal: Negative for nausea. Neurological: Positive for tingling, sensory change, focal weakness and headaches. All other systems reviewed and are negative.       Past Medical History:   Diagnosis Date    Keloid 6/12/2018     Family History   Problem Relation Age of Onset    Cancer Mother      Social History     Socioeconomic History    Marital status:      Spouse name: Not on file    Number of children: Not on file    Years of education: Not on file    Highest education level: Not on file   Occupational History    Not on file   Social Needs    Financial resource strain: Not on file    Food insecurity     Worry: Not on file     Inability: Not on file    Transportation needs     Medical: Not on file     Non-medical: Not on file   Tobacco Use    Smoking status: Never Smoker    Smokeless tobacco: Never Used Substance and Sexual Activity    Alcohol use: No    Drug use: No    Sexual activity: Yes     Partners: Female   Lifestyle    Physical activity     Days per week: Not on file     Minutes per session: Not on file    Stress: Not on file   Relationships    Social connections     Talks on phone: Not on file     Gets together: Not on file     Attends Restorationist service: Not on file     Active member of club or organization: Not on file     Attends meetings of clubs or organizations: Not on file     Relationship status: Not on file    Intimate partner violence     Fear of current or ex partner: Not on file     Emotionally abused: Not on file     Physically abused: Not on file     Forced sexual activity: Not on file   Other Topics Concern    Not on file   Social History Narrative    Not on file     Current Outpatient Medications   Medication Sig    naproxen sodium (Aleve) 220 mg tablet Take 220 mg by mouth two (2) times daily (with meals).  acetaminophen (Tylenol Extra Strength) 500 mg tablet Take  by mouth every six (6) hours as needed for Pain. No current facility-administered medications for this visit. No Known Allergies      Neurologic Exam     Mental Status   Oriented to person, place, and time. Cranial Nerves   Cranial nerves II through XII intact. Motor Exam   Muscle bulk: normalLeft infraspinatus 4+ strength with very questionable reduced muscle bulk on the left compared to the right     Sensory Exam   Light touch normal.     Gait, Coordination, and Reflexes     Gait  Gait: normal    Tremor   Resting tremor: absentLeft-sided DTRs 3/4 right-sided DTRs 2/4     Physical Exam   Constitutional: He is oriented to person, place, and time. He appears well-developed and well-nourished. Cardiovascular: Normal rate. Pulmonary/Chest: Effort normal.   Neurological: He is oriented to person, place, and time. Gait normal.   Skin: Skin is warm and dry.    Psychiatric: His behavior is normal. Vitals reviewed. Visit Vitals  /80 (BP 1 Location: Right arm, BP Patient Position: Sitting)   Pulse 88   Temp 98.2 °F (36.8 °C) (Oral)   Ht 5' 6\" (1.676 m)   Wt 129.7 kg (286 lb)   SpO2 98%   BMI 46.16 kg/m²       Lab Results   Component Value Date/Time    WBC 7.2 06/22/2020 10:30 AM    HGB 15.8 06/22/2020 10:30 AM    HCT 48.6 06/22/2020 10:30 AM    PLATELET 143 78/19/5048 10:30 AM    MCV 89.3 06/22/2020 10:30 AM     Lab Results   Component Value Date/Time    Hemoglobin A1c 5.8 (H) 06/22/2020 02:44 PM    Glucose 101 (H) 06/22/2020 10:30 AM    LDL, calculated 121.2 (H) 06/22/2020 10:30 AM    Creatinine 0.95 06/22/2020 10:30 AM      Lab Results   Component Value Date/Time    Cholesterol, total 201 (H) 06/22/2020 10:30 AM    HDL Cholesterol 38 06/22/2020 10:30 AM    LDL, calculated 121.2 (H) 06/22/2020 10:30 AM    Triglyceride 209 (H) 06/22/2020 10:30 AM    CHOL/HDL Ratio 5.3 (H) 06/22/2020 10:30 AM     Lab Results   Component Value Date/Time    ALT (SGPT) 79 (H) 06/22/2020 10:30 AM    Alk. phosphatase 66 06/22/2020 10:30 AM    Bilirubin, total 0.4 06/22/2020 10:30 AM    Albumin 3.9 06/22/2020 10:30 AM    Protein, total 7.7 06/22/2020 10:30 AM    PLATELET 826 61/97/5214 10:30 AM        CT Results (maximum last 3): No results found for this or any previous visit. MRI Results (maximum last 3): No results found for this or any previous visit. PET Results (maximum last 3): No results found for this or any previous visit. Assessment and Plan   Diagnoses and all orders for this visit:    1. Left arm weakness  -     MRI CERV SPINE WO CONT; Future  -     REFERRAL TO ORTHOPEDIC SURGERY    2. Cervical radiculopathy  -     MRI CERV SPINE WO CONT; Future  -     EMG NCV MOTOR WITH F/WAVE PER NERVE;  Future  -     REFERRAL TO ORTHOPEDIC SURGERY    3. Episodic tension-type headache, not intractable      53year-old gentleman presenting with bilateral upper extremity paresthesias with an exam showing subtle left infraspinatus weakness and asymmetric reflexes elevated on the left. Given the history of degenerative disc disease and abnormal exam I think we need to obtain an MRI of the cervical spine to evaluate for myelopathy. Check an EMG to evaluate for carpal tunnel which is also possible bilaterally. I am sending him to orthopedics for further evaluation and possible pain control. I would like him to call me after the EMG is done. I reviewed and decided to continue the current medications. This clinical note was dictated with an electronic dictation software that can make unintentional errors. If there are any questions, please contact me directly for clarification. A notice of this visit/encounter being completed has been sent electronically to the patient's PCP and/or referring provider.      [address] [facility], [facility] [address]  Diplomate DOYLEN

## 2020-06-23 NOTE — PROGRESS NOTES
Chief Complaint   Patient presents with    Headache     with numbness and tingling in finger, \"the headaches are not severe but they are progressively getting worse, the numbness in my hands is also getting worse\"     Visit Vitals  /80 (BP 1 Location: Right arm, BP Patient Position: Sitting)   Pulse 88   Temp 98.2 °F (36.8 °C) (Oral)   Ht 5' 6\" (1.676 m)   Wt 129.7 kg (286 lb)   SpO2 98%   BMI 46.16 kg/m²

## 2020-06-26 ENCOUNTER — DOCUMENTATION ONLY (OUTPATIENT)
Dept: NEUROLOGY | Age: 45
End: 2020-06-26

## 2020-07-02 ENCOUNTER — OFFICE VISIT (OUTPATIENT)
Dept: NEUROLOGY | Age: 45
End: 2020-07-02

## 2020-07-02 VITALS
RESPIRATION RATE: 16 BRPM | HEIGHT: 66 IN | BODY MASS INDEX: 45.96 KG/M2 | OXYGEN SATURATION: 98 % | WEIGHT: 286 LBS | SYSTOLIC BLOOD PRESSURE: 128 MMHG | DIASTOLIC BLOOD PRESSURE: 78 MMHG | HEART RATE: 78 BPM

## 2020-07-02 DIAGNOSIS — G56.03 BILATERAL CARPAL TUNNEL SYNDROME: ICD-10-CM

## 2020-07-02 NOTE — PROGRESS NOTES
[address] [facility]  [address] [facility], [address] [facility]  Phone [phone] Fax [phone]  Test Date:  2020    Patient: Den Barrera : 1975 Physician: Wing Smith MD   Sex: Male Height: 5' 6\" Ref Phys: Loida Sampson, DO   ID#: [id number] Weight: 286 lbs. Technician: Lizbeth Loyola. .EMG TECH     Patient Complaints:  BUE    Patient History / Exam:  49-year-old male who is being evaluated for numbness in both hands. On exam: Alert and fully oriented. Cranial nerves II through XII intact. Muscle tone and bulk normal per strength normal in both upper and lower extremities. DTRs 2/2 and symmetric. Toes downgoing. Sensation intact. Gait normal.  Positive Tinel's and Phalen sign at both wrists. NCV & EMG Findings:  Evaluation of the left median motor and the right median motor nerves showed prolonged distal onset latency (L6.9, R8.9 ms). The left median sensory and the right median sensory nerves showed no response (Wrist). The left median/ulnar (palm) comparison nerve showed no response (Median Palm) and prolonged distal peak latency (Ulnar Palm, 2.9 ms). The right median/ulnar (palm) comparison nerve showed no response (Median Palm). All remaining nerves  were within normal limits. All F Wave latencies were within normal limits. All F Wave left vs. right side latency differences were within normal limits. Needle evaluation of the left abductor pollicis brevis and the right abductor pollicis brevis muscles showed increased motor unit amplitude. All remaining muscles (as indicated in the following table) showed no evidence of electrical instability. 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 carpal tunnel syndrome at both wrists. Right side is slightly worse than left.   The lesion is of demyelinating type affecting sensory greater than motor fibers. No significant acute or chronic denervation seen in abductor pollicis brevis muscle.   No evidence of cervical radiculopathy on either side    Recommendations:  Consider referral to hand surgery for carpal tunnel release    ___________________________  Buffy Thomas MD        Nerve Conduction Studies  Anti Sensory Summary Table     Stim Site NR Peak (ms) Norm Peak (ms) P-T Amp (µV) Norm P-T Amp Onset (ms) Site1 Site2 Delta-P (ms) Dist (cm) Vaughn (m/s) Norm Vaughn (m/s)   Left Median Anti Sensory (2nd Digit)  30.3°C   Wrist NR  <3.6  >10  Wrist 2nd Digit  14.0  >39   Right Median Anti Sensory (2nd Digit)  30.9°C   Wrist NR  <3.6  >10  Wrist 2nd Digit  14.0  >39   Left Radial Anti Sensory (Base 1st Digit)  30.3°C   Wrist    2.0 <3.1 42.6  1.7 Wrist Base 1st Digit 2.0 10.0 50    Right Radial Anti Sensory (Base 1st Digit)  31.5°C   Wrist    2.1 <3.1 42.8  1.6 Wrist Base 1st Digit 2.1 10.0 48    Left Ulnar Anti Sensory (5th Digit)  30.4°C   Wrist    3.2 <3.7 24.5 >15.0 2.1 Wrist 5th Digit 3.2 14.0 44 >38   Right Ulnar Anti Sensory (5th Digit)  31.6°C   Wrist    3.2 <3.7 22.2 >15.0 2.7 Wrist 5th Digit 3.2 14.0 44 >38     Motor Summary Table     Stim Site NR Onset (ms) Norm Onset (ms) O-P Amp (mV) Norm O-P Amp Site1 Site2 Delta-0 (ms) Dist (cm) Vaughn (m/s) Norm Vaughn (m/s)   Left Median Motor (Abd Poll Brev)  29.7°C   Wrist    6.9 <4.2 10.3 >5 Elbow Wrist 3.5 20.0 57 >50   Elbow    10.4  10.1          Right Median Motor (Abd Poll Brev)  30.4°C   Wrist    8.9 <4.2 8.1 >5 Elbow Wrist 3.3 19.0 58 >50   Elbow    12.2  7.0          Left Ulnar Motor (Abd Dig Minimi)  29.9°C   Wrist    2.7 <4.2 9.4 >3 B Elbow Wrist 3.3 20.0 61 >53   B Elbow    6.0  9.1  A Elbow B Elbow 1.9 10.0 53 >53   A Elbow    7.9  9.1          Right Ulnar Motor (Abd Dig Minimi)  31.1°C   Wrist    2.6 <4.2 10.3 >3 B Elbow Wrist 3.6 21.0 58 >53   B Elbow    6.2  9.7  A Elbow B Elbow 1.8 10.0 56 >53   A Elbow    8.0  9.6 Comparison Summary Table     Stim Site NR Peak (ms) Norm Peak (ms) P-T Amp (µV) Site1 Site2 Delta-P (ms) Norm Delta (ms)   Left Median/Ulnar Palm Comparison (Wrist - 8cm)  29.9°C   Median Palm NR  <2.5  Median Palm Ulnar Palm  <0.3   Ulnar Palm    2.9 <2.5 0.3       Site 3    3.5  1.8       Right Median/Ulnar Palm Comparison (Wrist - 8cm)  31.5°C   Median Palm NR  <2.5  Median Palm Ulnar Palm  <0.3   Ulnar Palm    1.8 <2.5 5.5         F Wave Studies     NR F-Lat (ms) Lat Norm (ms) L-R F-Lat (ms) L-R Lat Norm   Left Ulnar (Mrkrs) (Abd Dig Min)  29.8°C      28.35 <36 0.00 <2.5   Right Ulnar (Mrkrs) (Abd Dig Min)  30.9°C      28.35 <36 0.00 <2.5     EMG     Side Muscle Nerve Root Ins Act Fibs Psw Amp Dur Poly Recrt Int Pat Comment   Left Abd Poll Brev Median C8-T1 Nml Nml Nml Incr Nml 0 Nml Nml    Left 1stDorInt Ulnar C8-T1 Nml Nml Nml Nml Nml 0 Nml Nml    Left BrachioRad Radial C5-6 Nml Nml Nml Nml Nml 0 Nml Nml    Left PronatorTeres Median C6-7 Nml Nml Nml Nml Nml 0 Nml Nml    Left Triceps Radial C6-7-8 Nml Nml Nml Nml Nml 0 Nml Nml    Left Deltoid Axillary C5-6 Nml Nml Nml Nml Nml 0 Nml Nml    Right Abd Poll Brev Median C8-T1 Nml Nml Nml Incr Nml 0 Nml Nml    Right 1stDorInt Ulnar C8-T1 Nml Nml Nml Nml Nml 0 Nml Nml    Right BrachioRad Radial C5-6 Nml Nml Nml Nml Nml 0 Nml Nml    Right PronatorTeres Median C6-7 Nml Nml Nml Nml Nml 0 Nml Nml    Right Triceps Radial C6-7-8 Nml Nml Nml Nml Nml 0 Nml Nml    Right Deltoid Axillary C5-6 Nml Nml Nml Nml Nml 0 Nml Nml          Waveforms:

## 2020-07-06 ENCOUNTER — HOSPITAL ENCOUNTER (OUTPATIENT)
Dept: MRI IMAGING | Age: 45
Discharge: HOME | End: 2020-07-06
Attending: PSYCHIATRY & NEUROLOGY
Payer: COMMERCIAL

## 2020-07-06 DIAGNOSIS — R29.898 LEFT ARM WEAKNESS: ICD-10-CM

## 2020-07-06 DIAGNOSIS — M54.12 CERVICAL RADICULOPATHY: ICD-10-CM

## 2020-07-06 PROCEDURE — 72141 MRI NECK SPINE W/O DYE: CPT

## 2020-07-07 ENCOUNTER — DOCUMENTATION ONLY (OUTPATIENT)
Dept: NEUROLOGY | Age: 45
End: 2020-07-07

## 2020-07-07 ENCOUNTER — TELEPHONE (OUTPATIENT)
Dept: NEUROLOGY | Age: 45
End: 2020-07-07

## 2020-07-07 DIAGNOSIS — G56.03 BILATERAL CARPAL TUNNEL SYNDROME: ICD-10-CM

## 2020-07-07 DIAGNOSIS — M48.02 NEUROFORAMINAL STENOSIS OF CERVICAL SPINE: Primary | ICD-10-CM

## 2020-07-07 NOTE — TELEPHONE ENCOUNTER
Okay I checked the studies. Nerve conduction study does show he has carpal tunnel syndrome in both hands worse on the right. He also has evidence of degeneration in the spine causing some pinched nerves which can also cause pain. These are 2 different situations happening with different management. I am going to send him to Dr. Paramjit Storm for an opinion about the spine. I will refer him to Dr. Mayur Gilbert who is a hand specialist who can evaluate the carpal tunnel. I do not have a remedy for the symptoms but I can set him on the right course.

## 2022-03-18 PROBLEM — L91.0 KELOID: Status: ACTIVE | Noted: 2018-06-12

## 2023-05-10 RX ORDER — NAPROXEN SODIUM 220 MG
TABLET ORAL 2 TIMES DAILY WITH MEALS
COMMUNITY

## 2023-05-10 RX ORDER — ACETAMINOPHEN 500 MG
TABLET ORAL EVERY 6 HOURS PRN
COMMUNITY

## 2025-03-09 ENCOUNTER — HOSPITAL ENCOUNTER (EMERGENCY)
Facility: HOSPITAL | Age: 50
Discharge: HOME | End: 2025-03-09
Attending: STUDENT IN AN ORGANIZED HEALTH CARE EDUCATION/TRAINING PROGRAM
Payer: COMMERCIAL

## 2025-03-09 ENCOUNTER — APPOINTMENT (OUTPATIENT)
Facility: HOSPITAL | Age: 50
End: 2025-03-09
Payer: COMMERCIAL

## 2025-03-09 VITALS
DIASTOLIC BLOOD PRESSURE: 92 MMHG | RESPIRATION RATE: 18 BRPM | SYSTOLIC BLOOD PRESSURE: 151 MMHG | OXYGEN SATURATION: 94 % | TEMPERATURE: 98.2 F | HEART RATE: 90 BPM

## 2025-03-09 DIAGNOSIS — M25.562 ACUTE PAIN OF BOTH KNEES: Primary | ICD-10-CM

## 2025-03-09 DIAGNOSIS — M25.561 ACUTE PAIN OF BOTH KNEES: Primary | ICD-10-CM

## 2025-03-09 DIAGNOSIS — M25.571 ACUTE RIGHT ANKLE PAIN: ICD-10-CM

## 2025-03-09 PROCEDURE — 96372 THER/PROPH/DIAG INJ SC/IM: CPT

## 2025-03-09 PROCEDURE — 73562 X-RAY EXAM OF KNEE 3: CPT

## 2025-03-09 PROCEDURE — 6360000002 HC RX W HCPCS: Performed by: EMERGENCY MEDICINE

## 2025-03-09 PROCEDURE — 73610 X-RAY EXAM OF ANKLE: CPT

## 2025-03-09 PROCEDURE — 6370000000 HC RX 637 (ALT 250 FOR IP): Performed by: EMERGENCY MEDICINE

## 2025-03-09 PROCEDURE — 99284 EMERGENCY DEPT VISIT MOD MDM: CPT

## 2025-03-09 RX ORDER — KETOROLAC TROMETHAMINE 30 MG/ML
30 INJECTION, SOLUTION INTRAMUSCULAR; INTRAVENOUS
Status: COMPLETED | OUTPATIENT
Start: 2025-03-09 | End: 2025-03-09

## 2025-03-09 RX ORDER — FAMOTIDINE 20 MG/1
20 TABLET, FILM COATED ORAL
Status: COMPLETED | OUTPATIENT
Start: 2025-03-09 | End: 2025-03-09

## 2025-03-09 RX ORDER — PREDNISONE 20 MG/1
20 TABLET ORAL DAILY
Qty: 5 TABLET | Refills: 0 | Status: SHIPPED | OUTPATIENT
Start: 2025-03-09 | End: 2025-03-14

## 2025-03-09 RX ORDER — TRAMADOL HYDROCHLORIDE 50 MG/1
50 TABLET ORAL EVERY 4 HOURS PRN
Qty: 18 TABLET | Refills: 0 | Status: SHIPPED | OUTPATIENT
Start: 2025-03-09 | End: 2025-03-09 | Stop reason: CLARIF

## 2025-03-09 RX ORDER — TRAMADOL HYDROCHLORIDE 50 MG/1
50 TABLET ORAL EVERY 4 HOURS PRN
Qty: 18 TABLET | Refills: 0 | Status: SHIPPED | OUTPATIENT
Start: 2025-03-09 | End: 2025-03-12

## 2025-03-09 RX ORDER — PREDNISONE 20 MG/1
20 TABLET ORAL
Status: COMPLETED | OUTPATIENT
Start: 2025-03-09 | End: 2025-03-09

## 2025-03-09 RX ORDER — PREDNISONE 20 MG/1
20 TABLET ORAL DAILY
Qty: 5 TABLET | Refills: 0 | Status: SHIPPED | OUTPATIENT
Start: 2025-03-09 | End: 2025-03-09 | Stop reason: CLARIF

## 2025-03-09 RX ADMIN — FAMOTIDINE 20 MG: 20 TABLET, FILM COATED ORAL at 14:06

## 2025-03-09 RX ADMIN — PREDNISONE 20 MG: 20 TABLET ORAL at 14:02

## 2025-03-09 RX ADMIN — KETOROLAC TROMETHAMINE 30 MG: 30 INJECTION, SOLUTION INTRAMUSCULAR at 14:03

## 2025-03-09 ASSESSMENT — PAIN DESCRIPTION - DESCRIPTORS: DESCRIPTORS: ACHING

## 2025-03-09 ASSESSMENT — PAIN DESCRIPTION - FREQUENCY: FREQUENCY: CONTINUOUS

## 2025-03-09 ASSESSMENT — PAIN - FUNCTIONAL ASSESSMENT
PAIN_FUNCTIONAL_ASSESSMENT: ACTIVITIES ARE NOT PREVENTED
PAIN_FUNCTIONAL_ASSESSMENT: 0-10

## 2025-03-09 ASSESSMENT — PAIN DESCRIPTION - LOCATION: LOCATION: KNEE

## 2025-03-09 ASSESSMENT — PAIN SCALES - GENERAL
PAINLEVEL_OUTOF10: 9
PAINLEVEL_OUTOF10: 9

## 2025-03-09 ASSESSMENT — PAIN DESCRIPTION - ORIENTATION: ORIENTATION: LEFT;RIGHT

## 2025-03-09 ASSESSMENT — PAIN DESCRIPTION - PAIN TYPE: TYPE: ACUTE PAIN

## 2025-03-09 ASSESSMENT — PAIN DESCRIPTION - ONSET: ONSET: ON-GOING

## 2025-03-09 NOTE — ED TRIAGE NOTES
Patient arrives ambulatory with cane with complaints of bilateral knee and right ankle pain. Denies injury. Reports he has been working a new job that has him standing more frequently.

## 2025-03-09 NOTE — ED PROVIDER NOTES
[facility] EMERGENCY DEPARTMENT  EMERGENCY DEPARTMENT ENCOUNTER      Pt Name: Braeden Wen  MRN: [id number]  Birthdate 1975  Date of evaluation: 3/9/2025  Provider: ROHAN Lewis NP    CHIEF COMPLAINT       Chief Complaint   Patient presents with    Knee Pain         HISTORY OF PRESENT ILLNESS   (Location/Symptom, Timing/Onset, Context/Setting, Quality, Duration, Modifying Factors, Severity)  Note limiting factors.   HPI  Patient is a 49-year-old male with past medical history significant for keloids, hypertension, back pain, carpal tunnel disorder and obesity who presents to the ED with bilateral knee pain and right ankle pain for several days.  He states he started a new job recently at the  [employer] and stands for prolonged periods of time.  He denies any falls, direct injury or blunt trauma.Skin integrity is intact. There is no obvious deformity. Good neurovascular sensation. No obvious joint effusion or joint instability. Pain increases with weight bearing; flexion and extension.  He is using a cane to avoid full weightbearing.  He has not had a pain medications today prior to arrival.      Review of External Medical Records:     Nursing Notes were reviewed.    REVIEW OF SYSTEMS    (2-9 systems for level 4, 10 or more for level 5)     Review of Systems   Constitutional:  Positive for activity change. Negative for appetite change, fever and unexpected weight change.   HENT:  Negative for congestion and trouble swallowing.    Eyes:  Negative for visual disturbance.   Respiratory:  Negative for cough and shortness of breath.    Cardiovascular:  Negative for chest pain, palpitations and leg swelling.   Gastrointestinal:  Negative for abdominal pain, constipation, diarrhea and nausea.   Genitourinary:  Negative for dysuria and flank pain.   Musculoskeletal:  Positive for arthralgias and gait problem.   Neurological:  Negative for headaches.   All other systems reviewed and are

## 2025-04-19 ENCOUNTER — APPOINTMENT (OUTPATIENT)
Facility: HOSPITAL | Age: 50
End: 2025-04-19
Payer: COMMERCIAL

## 2025-04-19 ENCOUNTER — HOSPITAL ENCOUNTER (EMERGENCY)
Facility: HOSPITAL | Age: 50
Discharge: HOME | End: 2025-04-19
Payer: COMMERCIAL

## 2025-04-19 VITALS
SYSTOLIC BLOOD PRESSURE: 161 MMHG | WEIGHT: 287.04 LBS | OXYGEN SATURATION: 93 % | RESPIRATION RATE: 20 BRPM | BODY MASS INDEX: 46.13 KG/M2 | HEART RATE: 88 BPM | HEIGHT: 66 IN | DIASTOLIC BLOOD PRESSURE: 101 MMHG | TEMPERATURE: 98.1 F

## 2025-04-19 DIAGNOSIS — R07.9 CHEST PAIN, UNSPECIFIED TYPE: ICD-10-CM

## 2025-04-19 DIAGNOSIS — R60.0 PERIPHERAL EDEMA: Primary | ICD-10-CM

## 2025-04-19 LAB
ALBUMIN SERPL-MCNC: 3.1 G/DL (ref 3.5–5)
ALBUMIN/GLOB SERPL: 0.6 (ref 1.1–2.2)
ALP SERPL-CCNC: 71 U/L (ref 45–117)
ALT SERPL-CCNC: 23 U/L (ref 12–78)
ANION GAP SERPL CALC-SCNC: 3 MMOL/L (ref 2–12)
AST SERPL-CCNC: 17 U/L (ref 15–37)
BASOPHILS # BLD: 0.03 K/UL (ref 0–0.1)
BASOPHILS NFR BLD: 0.3 % (ref 0–1)
BILIRUB SERPL-MCNC: 0.4 MG/DL (ref 0.2–1)
BUN SERPL-MCNC: 13 MG/DL (ref 6–20)
BUN/CREAT SERPL: 11 (ref 12–20)
CALCIUM SERPL-MCNC: 9.2 MG/DL (ref 8.5–10.1)
CHLORIDE SERPL-SCNC: 106 MMOL/L (ref 97–108)
CO2 SERPL-SCNC: 28 MMOL/L (ref 21–32)
CREAT SERPL-MCNC: 1.15 MG/DL (ref 0.7–1.3)
D DIMER PPP FEU-MCNC: 1.98 MG/L FEU (ref 0–0.65)
DIFFERENTIAL METHOD BLD: ABNORMAL
ECHO BSA: 2.46 M2
EKG ATRIAL RATE: 86 BPM
EKG DIAGNOSIS: NORMAL
EKG P AXIS: 62 DEGREES
EKG P-R INTERVAL: 152 MS
EKG Q-T INTERVAL: 358 MS
EKG QRS DURATION: 78 MS
EKG QTC CALCULATION (BAZETT): 428 MS
EKG R AXIS: 46 DEGREES
EKG T AXIS: 35 DEGREES
EKG VENTRICULAR RATE: 86 BPM
EOSINOPHIL # BLD: 0.12 K/UL (ref 0–0.4)
EOSINOPHIL NFR BLD: 1.3 % (ref 0–7)
ERYTHROCYTE [DISTWIDTH] IN BLOOD BY AUTOMATED COUNT: 13.9 % (ref 11.5–14.5)
GLOBULIN SER CALC-MCNC: 5 G/DL (ref 2–4)
GLUCOSE SERPL-MCNC: 114 MG/DL (ref 65–100)
HCT VFR BLD AUTO: 39.6 % (ref 36.6–50.3)
HGB BLD-MCNC: 12.8 G/DL (ref 12.1–17)
IMM GRANULOCYTES # BLD AUTO: 0.02 K/UL (ref 0–0.04)
IMM GRANULOCYTES NFR BLD AUTO: 0.2 % (ref 0–0.5)
LYMPHOCYTES # BLD: 1.4 K/UL (ref 0.8–3.5)
LYMPHOCYTES NFR BLD: 15 % (ref 12–49)
MCH RBC QN AUTO: 27.5 PG (ref 26–34)
MCHC RBC AUTO-ENTMCNC: 32.3 G/DL (ref 30–36.5)
MCV RBC AUTO: 85.2 FL (ref 80–99)
MONOCYTES # BLD: 0.62 K/UL (ref 0–1)
MONOCYTES NFR BLD: 6.7 % (ref 5–13)
NEUTS SEG # BLD: 7.13 K/UL (ref 1.8–8)
NEUTS SEG NFR BLD: 76.5 % (ref 32–75)
NRBC # BLD: 0 K/UL (ref 0–0.01)
NRBC BLD-RTO: 0 PER 100 WBC
NT PRO BNP: 24 PG/ML
PLATELET # BLD AUTO: 317 K/UL (ref 150–400)
PMV BLD AUTO: 10.7 FL (ref 8.9–12.9)
POTASSIUM SERPL-SCNC: 3.9 MMOL/L (ref 3.5–5.1)
PROT SERPL-MCNC: 8.1 G/DL (ref 6.4–8.2)
RBC # BLD AUTO: 4.65 M/UL (ref 4.1–5.7)
SODIUM SERPL-SCNC: 137 MMOL/L (ref 136–145)
TROPONIN I SERPL HS-MCNC: 7 NG/L (ref 0–76)
WBC # BLD AUTO: 9.3 K/UL (ref 4.1–11.1)

## 2025-04-19 PROCEDURE — 80053 COMPREHEN METABOLIC PANEL: CPT

## 2025-04-19 PROCEDURE — 36415 COLL VENOUS BLD VENIPUNCTURE: CPT

## 2025-04-19 PROCEDURE — 6360000004 HC RX CONTRAST MEDICATION: Performed by: RADIOLOGY

## 2025-04-19 PROCEDURE — 71275 CT ANGIOGRAPHY CHEST: CPT

## 2025-04-19 PROCEDURE — 84484 ASSAY OF TROPONIN QUANT: CPT

## 2025-04-19 PROCEDURE — 93970 EXTREMITY STUDY: CPT

## 2025-04-19 PROCEDURE — 71045 X-RAY EXAM CHEST 1 VIEW: CPT

## 2025-04-19 PROCEDURE — 83880 ASSAY OF NATRIURETIC PEPTIDE: CPT

## 2025-04-19 PROCEDURE — 85379 FIBRIN DEGRADATION QUANT: CPT

## 2025-04-19 PROCEDURE — 99285 EMERGENCY DEPT VISIT HI MDM: CPT

## 2025-04-19 PROCEDURE — 85025 COMPLETE CBC W/AUTO DIFF WBC: CPT

## 2025-04-19 RX ORDER — IOPAMIDOL 755 MG/ML
100 INJECTION, SOLUTION INTRAVASCULAR
Status: COMPLETED | OUTPATIENT
Start: 2025-04-19 | End: 2025-04-19

## 2025-04-19 RX ADMIN — IOPAMIDOL 100 ML: 755 INJECTION, SOLUTION INTRAVENOUS at 17:35

## 2025-04-19 ASSESSMENT — ENCOUNTER SYMPTOMS
CHEST TIGHTNESS: 0
SHORTNESS OF BREATH: 0

## 2025-04-19 ASSESSMENT — PAIN - FUNCTIONAL ASSESSMENT: PAIN_FUNCTIONAL_ASSESSMENT: 0-10

## 2025-04-19 ASSESSMENT — PAIN DESCRIPTION - ORIENTATION: ORIENTATION: RIGHT;LEFT

## 2025-04-19 ASSESSMENT — HEART SCORE: ECG: NORMAL

## 2025-04-19 ASSESSMENT — PAIN SCALES - GENERAL: PAINLEVEL_OUTOF10: 8

## 2025-04-19 NOTE — ED PROVIDER NOTES
[facility] EMERGENCY DEPARTMENT  EMERGENCY DEPARTMENT ENCOUNTER         Pt Name: Braeden Wen  MRN: [id number]  Birthdate 1975  Date of evaluation: 4/19/2025  Provider: Sharron Craig PA-C   PCP: Bryn Lilly MD  Note Started: 6:30 PM EDT 4/19/25     CHIEF COMPLAINT       Chief Complaint   Patient presents with    Chest Pain    Leg Swelling     Pt presents ambulatory to triage w/ complaints of BLE pain, swelling and L sided CP x2 weeks - denies SOB and dizziness at this time         HISTORY OF PRESENT ILLNESS: 1 or more elements      History From: Patient  HPI Limitations: None  Arrival Mode:     Braeden Wen is a 49 y.o. male who presents ambulatory to triage with complaints of bilateral leg pain and swelling in addition to left-sided chest pain x 2 weeks.  Patient is not currently experiencing the chest pain.  Patient denies any shortness of breath, he denies any dizziness.  He denies any injuries accidents or trauma.  Patient states the leg swelling has been ongoing for much longer than the chest pain.  He states that the chest pain is intermittent.  He states that the leg swelling always improves after a night of sleep.  He states after he starts walking on his legs he notices the swelling.  Patient has no prior significant medical history or cardiac history.  The only medications he is currently taking are losartan, metformin and Ozempic.  Patient states that a few months ago he went to urgent care where he had x-rays done of his legs and was told that he has some bone spurs.  On exam patient has bilateral swollen calves which are mildly edematous.  Some very mild ulcers forming.  There is tenderness to palpation of the muscles.  No tenderness along the deep venous system.  No unilateral swelling.  Patient's lungs are clear to auscultation.  Heart rate and rhythm is normal.  Heart score of 2.     Nursing Notes were all reviewed and agreed with or any disagreements were addressed in the HPI.  Please

## 2025-04-19 NOTE — ED NOTES
RN assumed care of pt, per triage note:     Pt presents ambulatory to triage w/ complaints of BLE pain, swelling and L sided CP x2 weeks - denies SOB and dizziness at this time

## 2025-04-19 NOTE — ED NOTES
Pt started to desat to 84% at rest while he reports sleeping. Pt sats 96% while alert. RN inquired about HX of sleep apnea to which pt reports, \"I have been told I have it.\" RN applied 2L NC while pt continues to rest

## 2025-05-02 LAB
EKG ATRIAL RATE: 86 BPM
EKG DIAGNOSIS: NORMAL
EKG P AXIS: 62 DEGREES
EKG P-R INTERVAL: 152 MS
EKG Q-T INTERVAL: 358 MS
EKG QRS DURATION: 78 MS
EKG QTC CALCULATION (BAZETT): 428 MS
EKG R AXIS: 46 DEGREES
EKG T AXIS: 35 DEGREES
EKG VENTRICULAR RATE: 86 BPM